# Patient Record
Sex: MALE
[De-identification: names, ages, dates, MRNs, and addresses within clinical notes are randomized per-mention and may not be internally consistent; named-entity substitution may affect disease eponyms.]

---

## 2019-07-31 PROBLEM — Z00.00 ENCOUNTER FOR PREVENTIVE HEALTH EXAMINATION: Status: ACTIVE | Noted: 2019-07-31

## 2022-04-19 ENCOUNTER — APPOINTMENT (OUTPATIENT)
Dept: ORTHOPEDIC SURGERY | Facility: CLINIC | Age: 52
End: 2022-04-19
Payer: COMMERCIAL

## 2022-04-19 VITALS — HEIGHT: 64 IN | BODY MASS INDEX: 25.61 KG/M2 | WEIGHT: 150 LBS

## 2022-04-19 DIAGNOSIS — Z78.9 OTHER SPECIFIED HEALTH STATUS: ICD-10-CM

## 2022-04-19 DIAGNOSIS — M25.512 PAIN IN LEFT SHOULDER: ICD-10-CM

## 2022-04-19 DIAGNOSIS — S46.012A STRAIN OF MUSCLE(S) AND TENDON(S) OF THE ROTATOR CUFF OF LEFT SHOULDER, INITIAL ENCOUNTER: ICD-10-CM

## 2022-04-19 PROBLEM — Z00.00 ENCOUNTER FOR PREVENTIVE HEALTH EXAMINATION: Noted: 2022-04-19

## 2022-04-19 PROCEDURE — 73030 X-RAY EXAM OF SHOULDER: CPT | Mod: LT

## 2022-04-19 PROCEDURE — 99203 OFFICE O/P NEW LOW 30 MIN: CPT

## 2022-04-19 NOTE — PHYSICAL EXAM
[4 ___] : forward flexion 4[unfilled]/5 [4___] : external rotation 4[unfilled]/5 [5___] : internal rotation 5[unfilled]/5 [Left] : left shoulder [de-identified] : Constitutional: The general appearance of the patient is well developed, well nourished, no deformities and well groomed. Normal\par \par  Gait: Gait and function is as follows: normal gait.\par \par  Skin: Head and neck visualized skin is normal. Left upper extremity visualized skin is normal. Right upper extremity visualized skin is normal. \par \par  Cardiovascular: palpable radial pulse bilaterally.\par \par  Neurologic: The patient is oriented to time, place and person. Sensation to light touch intact in the bilateral upper extremities. Mood and Affect is normal.\par  [] : negative Malorie [FreeTextEntry3] : wasting of the infraspinatus  [FreeTextEntry8] : supra not TTP [FreeTextEntry9] : mild scapular dyskinesia in FE and ABD. +neer [de-identified] : 4/5 infra [FreeTextEntry1] : no GHOA. no high riding humeral head. mild ac joint OA. type I acromion [TWNoteComboBox7] : active forward flexion 160 degrees [de-identified] : active abduction 110 degrees [TWNoteComboBox6] : internal rotation L1 [de-identified] : external rotation 60 degrees

## 2022-04-19 NOTE — REASON FOR VISIT
[FreeTextEntry2] : The patient is being seen here today as a new patient for an evaluation of his left shoulder pain for duration of 2 months.

## 2022-04-19 NOTE — HISTORY OF PRESENT ILLNESS
[de-identified] : The patient is a 52 year old presenting to the office for ongoing left shoulder pain. 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 He has PSx of the left shoulder labral debridement with Dr. Kilgore in 2008 which felt good for until this February. The pain is on the anterior aspect of the shoulder, worse with overhead activity. Patient reports pain/function has been getting progressively worse. Patient denies any paresthesias. The patient denies any recent PT or injections for the shoulder. Patient reports that OTC AIs are moderately helpful for pain. The patient denies a PHx of diabetes or thyroid disease. Patient has documented DDD of the cervical spine.\par

## 2022-05-19 ENCOUNTER — APPOINTMENT (OUTPATIENT)
Dept: ORTHOPEDIC SURGERY | Facility: CLINIC | Age: 52
End: 2022-05-19

## 2024-05-03 NOTE — DISCUSSION/SUMMARY
[de-identified] : The patient has left shoulder probable tear of the infraspinatus tendon   \par  \par \par The patient was prescribed supervised PT for the left shoulder \par The patient will follow up in 4-6 weeks with a copy of his MRI images for the left shoulder \par If his pain or function remains intolerable, we could consider a cortisone injection\par  \par \par I, Dr. Pawan Perez, attest that this documentation was recorded by the scribe, in my presence, and that it accurately reflects the service I personally performed, and the decisions made by me with my edits as appropriate.\par \par I, Jose Pérez, acting as scribe, attest that this documentation has been prepared under the direction and in the presence of Provider Pawan Perez MD.\par  no